# Patient Record
(demographics unavailable — no encounter records)

---

## 2024-10-29 NOTE — HISTORY OF PRESENT ILLNESS
[FreeTextEntry1] : INDU DEL REAL  is a 45 year old  M Returns for annual clinical follow-up.  Today's EKG demonstrates normal sinus rhythm left ventricular hypertrophy associated IVCD and poor R wave progression this is not significantly changed from last year's EKG on today's exam his blood pressure is markedly elevated a CT angio was performed January 2024 this had a 0 calcium score there was no evidence of coronary artery disease normal coronary arteries by CTA with 0 calcium score prior echocardiogram suggest hypertensive heart disease with left ventricular hypertrophy left atrial enlargement and borderline aortic dimensions a follow-up echo has been requested last blood work from February 2024 total cholesterol 176  creatinine 0.7 potassium 3.7 hemoglobin 13.9 long-term goal LDL at least less than 100.  Due to recent CTA with normal coronary arteries initial trial of lifestyle measures his blood pressure is markedly elevated today.  He reports this is unusual for him today.  He had not taken his medications today.  He has difficulty losing weight.  He is physically active at work.  Discussed GLP agonist for cardiovascular risk reduction.  Adjunctive benefits of blood pressure control and treatment of sleep apnea.  Follow-up echocardiogram.  Reviewed CTA.  There is a history of hypertension, overweight, prediabetes, sleep apnea.  Instructed to notify our office of blood pressure remains elevated.  On my exam his blood pressure is 150/100. Hx of hypertension, borderline HLD.  He reports longstanding hypertension. ADR Diovan/HCTZ - dizziness Patient currently taking amlodipine and lisinopril/HCTZ.   He works in construction. intolerant of sleep apnea treatment.   Echo 10/17/22 EF 60-65%, mild MR, compared to prior LVH is not noted  Nonspecific EKG. Hypertensive heart disease. Continue combination antihypertensive therapy. treatment of sleep apnea.  Low salt diet. Prior intol to Valsartan/HCTZ

## 2024-11-27 NOTE — HISTORY OF PRESENT ILLNESS
[FreeTextEntry1] : INDU DEL REAL  is a 45 year old  M  Hx of hypertension, borderline HLD.  He reports longstanding hypertension. ADR Diovan/HCTZ - dizziness Patient currently taking amlodipine and lisinopril/HCTZ.   intolerant of sleep apnea treatment.  He has difficulty losing weight.  He is physically active at work.  His home BP similar to today 130s/80s. He is now on wegovy and lost a few lbs.  He denies chest pain, pressure, palpitations, unusual shortness of breath, orthopnea, LE edema, lightheadedness, dizziness, near syncope or syncope.   Echo 11/26/24 EF 60-65% mild LVH, seen on echo in 2021 EKG demonstrates normal sinus rhythm left ventricular hypertrophy associated IVCD and poor R wave progression this is not significantly changed from last year's EKG   CT angio was performed January 2024 this had a 0 calcium score there was no evidence of coronary artery disease normal coronary arteries by CTA with 0 calcium score  prior echocardiogram suggest hypertensive heart disease with left ventricular hypertrophy left atrial enlargement and borderline aortic dimensions  last blood work from February 2024 total cholesterol 176  creatinine 0.7 potassium 3.7 hemoglobin 13.9

## 2024-11-27 NOTE — HISTORY OF PRESENT ILLNESS
[FreeTextEntry1] : INDU DEL REAL  is a 45 year old  M  Hx of hypertension, borderline HLD.  He reports longstanding hypertension. ADR Diovan/HCTZ - dizziness Patient currently taking amlodipine and lisinopril/HCTZ.   intolerant of sleep apnea treatment.  He has difficulty losing weight.  He is physically active at work.  His home BP similar to today 130s/80s. He is now on wegovy and lost a few lbs.  He denies chest pain, pressure, palpitations, unusual shortness of breath, orthopnea, LE edema, lightheadedness, dizziness, near syncope or syncope.   Echo 11/26/24 EF 60-65% mild LVH, seen on echo in 2021 EKG demonstrates normal sinus rhythm left ventricular hypertrophy associated IVCD and poor R wave progression this is not significantly changed from last year's EKG   CT angio was performed January 2024 this had a 0 calcium score there was no evidence of coronary artery disease normal coronary arteries by CTA with 0 calcium score  prior echocardiogram suggest hypertensive heart disease with left ventricular hypertrophy left atrial enlargement and borderline aortic dimensions  last blood work from February 2024 total cholesterol 176  creatinine 0.7 potassium 3.7 hemoglobin 13.9   WDL

## 2024-11-27 NOTE — ASSESSMENT
[FreeTextEntry1] : INDU DEL REAL is a 45 year old M who presents today Nov 26, 2024 with the above history and the following active issues:   HTN Hypertensive heart disease. Mild LVH unchanged on recent echo BP is above goal reviewed < 130/80  Continue combination antihypertensive therapy. treatment of sleep apnea.  Low salt diet. Prior intol to Valsartan/HCTZ with weight loss, hope to achieve BP goal, will cont wegovy prior to adding antihypertensives will see him back in 3 mo to reassess  HLD long-term goal LDL at least less than 100.  Due to recent CTA with normal coronary arteries initial trial of lifestyle measures  labs before next office visit  Obesity Discussed GLP agonist for cardiovascular risk reduction.   There is a history of hypertension, overweight, prediabetes, sleep apnea.  Adjunctive benefits of blood pressure control and treatment of sleep apnea.   uptitrate as tolerated  Sincerely,   SANDRA Torres Patients history, testing, medications, and any relative changes to plan of care reviewed with supervising MD: Dr. George Guan

## 2025-02-25 NOTE — HISTORY OF PRESENT ILLNESS
[FreeTextEntry1] : INDU DEL REAL  is a 46 year old  M November 2024 normal left ventricular function mild left ventricular hypertrophy normal pulmonary pressures hypertensive heart disease returns to review recent labs.  Blood work February 2025 total cholesterol 191  creatinine 0.8 A1c 5.1 TSH 0.5 hemoglobin 14.6 blood pressure improved.  Diastolic numbers remain borderline.  He started Wegovy.  He does note a mild headache.  The dose has been uptitrated.  If continues to have adverse effect discussed possible transition to tirzepatide.  Monitor home blood pressure.  Contact office if blood pressure persistently elevated.  Cholesterol persistently elevated.  Discussed lipid-lowering for cardiovascular risk reduction.  He reports baseline joint aches.  Low-dose statin.  Follow-up blood work.  Shared decision making.  Adjunctive dietary measures.  Instructed to call if adverse effect. Hx of hypertension, borderline HLD.  He reports longstanding hypertension. ADR Diovan/HCTZ - dizziness currently taking amlodipine and lisinopril/HCTZ.   intolerant of sleep apnea treatment.  He has difficulty losing weight.  He is physically active at work.  His home BP similar to today 130s/80s. He is now on wegovy and lost a few lbs.  He denies chest pain, pressure, palpitations, unusual shortness of breath, orthopnea, LE edema, lightheadedness, dizziness, near syncope or syncope.   Echo 11/26/24 EF 60-65% mild LVH, seen on echo in 2021 EKG demonstrates normal sinus rhythm left ventricular hypertrophy associated IVCD and poor R wave progression this is not significantly changed from last year's EKG  CT angio was performed January 2024 this had a 0 calcium score there was no evidence of coronary artery disease normal coronary arteries by CTA with 0 calcium score  prior echocardiogram suggest hypertensive heart disease with left ventricular hypertrophy left atrial enlargement and borderline aortic dimensions   HTN Hypertensive heart disease. Mild LVH  Continue combination antihypertensive therapy. treatment of sleep apnea.  Low salt diet. Prior intol to Valsartan/HCTZ with weight loss, hope to achieve BP goal, will cont wegovy prior to adding antihypertensives  HLD long-term goal LDL at least less than 100.  Due to recent CTA with normal coronary arteries initial trial of lifestyle measures  labs before next office visit  Obesity Discussed GLP agonist for cardiovascular risk reduction.   There is a history of hypertension, overweight, prediabetes, sleep apnea.  Adjunctive benefits of blood pressure control and treatment of sleep apnea.   uptitrate as tolerated